# Patient Record
Sex: MALE | Race: WHITE
[De-identification: names, ages, dates, MRNs, and addresses within clinical notes are randomized per-mention and may not be internally consistent; named-entity substitution may affect disease eponyms.]

---

## 2017-03-07 ENCOUNTER — HOSPITAL ENCOUNTER (OUTPATIENT)
Dept: HOSPITAL 39 - GMAM | Age: 77
Discharge: HOME | End: 2017-03-07
Attending: FAMILY MEDICINE
Payer: MEDICARE

## 2017-03-07 DIAGNOSIS — E87.6: Primary | ICD-10-CM

## 2017-07-14 ENCOUNTER — HOSPITAL ENCOUNTER (OUTPATIENT)
Dept: HOSPITAL 39 - GMAM | Age: 77
End: 2017-07-14
Attending: FAMILY MEDICINE
Payer: MEDICARE

## 2017-07-14 DIAGNOSIS — Z12.5: Primary | ICD-10-CM

## 2018-01-03 ENCOUNTER — HOSPITAL ENCOUNTER (OUTPATIENT)
Dept: HOSPITAL 39 - GMAM | Age: 78
Discharge: HOME | End: 2018-01-03
Attending: FAMILY MEDICINE
Payer: MEDICARE

## 2018-01-03 DIAGNOSIS — I10: ICD-10-CM

## 2018-01-03 DIAGNOSIS — E11.65: Primary | ICD-10-CM

## 2018-01-03 DIAGNOSIS — R97.20: ICD-10-CM

## 2018-01-12 ENCOUNTER — HOSPITAL ENCOUNTER (OUTPATIENT)
Dept: HOSPITAL 39 - RAD | Age: 78
Discharge: HOME | End: 2018-01-12
Attending: PHYSICIAN ASSISTANT
Payer: MEDICARE

## 2018-01-12 DIAGNOSIS — R31.0: Primary | ICD-10-CM

## 2018-01-12 NOTE — CT
EXAM DESCRIPTION: 

Abdomen/Pelvis w/o Contrast: Computed tomography



CLINICAL HISTORY: 

GROSS HEMATURIA



COMPARISON: 

None.



TECHNIQUE: 

Spiral-axial scans at 5.0 mm intervals through the abdomen and

pelvis. Coronal and sagittal 2.0mm reconstructions. No IV or oral

contrast.  Total Exam DLP: 664.66 mGy-cm.  This exam was

performed according to our departmental CT dose-optimization

program which includes automated exposure control, adjustment of

the mA and/or kV according to patient size and/or use of

iterative reconstruction technique; to reduce radiation dose to

as low as reasonably achievable (ALARA). 



FINDINGS:

Kidneys and Ureters: 2 mm radiodense stone in the distal right

ureter which is dilated with minimal periureteral edema. Normal

caliber of the mid and proximal ureter no radiodense stones in

the right kidney or hydronephrosis/perinephric stranding. Also

mass effect on the distal right ureter from prostate gland.

Minimal mass effect on the left UVJ from the prostate gland.

Normal caliber of the left ureter with no radiodense stones. No

hydronephrosis/perinephric stranding on the left. Probable

vascular calcification in the upper anterior left renal pelvis.

Pelvic Organs: Marked enlargement of the prostate gland with

calcifications in the central zone and peripheral solid and

lobulation in heterogeneous density in the upper gland impressing

on the bladder. 6.0 cm craniocaudal and 6.2 x 5.3 cm transverse.

Mass effect also on the seminal vesicles. No free fluid in the

anterior peritoneal reflection.

Lung and pleura bases: 4 mm soft tissue nodule in the right lower

lobe subpleural region (series 2, image 13). Coronary artery

stents and calcifications.

Liver, spleen, stomach, and adrenal glands: Vascular

calcifications around these organs which are not enlarged with

normal density. No ascites.

Pancreas, Gallbladder, Ducts: Multiple layering stones in the

gallbladder no definite wall thickening no fatty stranding.

Normal caliber of the common bile duct. Calcification in the mid

pancreas.

Aorta: Marked atherosclerotic wall calcification predominately

mid and distally. Calcification also the ostia of the celiac

axis, SMA bilateral renal arteries and DEONNA.

Small Bowel: Negative.

Terminal Ileum/Cecum: Normal caliber of both. Appendix stump with

minimal thickening no surrounding stranding or fascial thickening

or fluid. 

Colon: Distention by fecal material entire length with extensive

redundancy of the sigmoid colon to the upper anterior mid

abdomen. No definite diverticula.

Mesentery: No stranding or fascial thickening. No free

intraperitoneal air.

Spine and Bony Pelvis: Lumbar levoscoliosis. Diffuse spondylosis

thoracic spine and mid to lower lumbar spine. Significant

bilateral foraminal narrowing L3-4 and L4-5 also bilateral

T10-11. Bilateral hypertrophic changes in the acetabula with

subchondral cysts and radiolucencies superior joint space

narrowing. Marginal spurs on the humeral femoral heads. Bilateral

bone islands in the iliac crests question of bone mineral density

loss or sclerotic lesions in the bilateral iliac bones and

ischial and bilateral femoral necks and intertrochanteric

regions.

Abdominal Wall/Back Soft Tissues: Bilateral fatty inguinal

hernias left greater than right not containing bowel. Abdominal

wall diastases at the umbilicus not containing bowel.



IMPRESSION:

1. No renal hydronephrosis or perinephric fluid bilaterally.

Dilation of the distal right ureter containing a 2 mm radiodense

stone with mass effect on the right UVJ from enlarged prostate

gland. Also exhibiting mass effect on the left UVJ and the base

of the urinary bladder. No radiodense stones, hydronephrosis or

perinephric fluid of the left kidney or distention of the left

ureter.

2. No radiodense stones in the bladder. Lobulated thickening of

the base of the bladder with mass effect from the prostate gland.

Cannot exclude mass at the base of the bladder; urologic consult

recommended. Enlarged prostate gland- Also consider serum PSA

evaluation.

3. Question of small lytic lesions versus loss of bone

mineralization pelvis and upper femurs. Diffuse spondylosis mid

and lower lumbar spine and thoracic spine.

4. Marked atherosclerotic changes in the abdominal aorta mid and

lower more significant. Calcification of the ostia of the major

vessels originating from the aorta. Consider CTA abdomen and

lower extremities if patient exhibiting signs of poor vascular

supply in the abdomen and PVD.

5. Bilateral fatty inguinal hernias not containing bowel.

Umbilical diastases not containing bowel. Abdominal organs not

enlarged. 4 mm soft tissue nodule right lung base is subclinical

at this time.



Electronically signed by:  Keith Mckeon MD  1/12/2018 11:41 AM

[facility] Workstation: [id number]

## 2018-01-17 ENCOUNTER — HOSPITAL ENCOUNTER (OUTPATIENT)
Dept: HOSPITAL 39 - LAB.O | Age: 78
Discharge: HOME | End: 2018-01-17
Attending: UROLOGY
Payer: MEDICARE

## 2018-01-17 DIAGNOSIS — R31.0: ICD-10-CM

## 2018-01-17 DIAGNOSIS — R97.20: Primary | ICD-10-CM

## 2018-01-18 ENCOUNTER — HOSPITAL ENCOUNTER (OUTPATIENT)
Dept: HOSPITAL 39 - CT | Age: 78
Discharge: HOME | End: 2018-01-18
Attending: UROLOGY
Payer: MEDICARE

## 2018-01-18 DIAGNOSIS — R31.0: Primary | ICD-10-CM

## 2018-01-18 NOTE — CT
EXAM DESCRIPTION: 

Abdomen/Pelvis w/Contrast: Computed Tomography.



CLINICAL HISTORY: 

GROSS HEMATURIA. Bladder mass.



COMPARISON: CT scan of abdomen and pelvis without IV or oral

contrast 1/12/2018.



TECHNIQUE: 

Spiral-axial scans at 5.0 mm intervals through the abdomen and

pelvis, after nonionic IV contrast. No oral contrast. Coronal and

sagittal 2.0 mm reconstructions. Delayed scans, liver through the

pelvis.   Axial-spiral 5mm. No adverse reactions.  Total Exam

DLP: 1627.9 mGy-cm.  This exam was performed according to our

departmental dose-optimization program which includes automated

exposure control, adjustment of the mA and/or kV according to

patient size and/or use of iterative reconstruction technique; to

reduce radiation dose to as low as reasonably achievable (ALARA).



FINDINGS: 

Kidneys and Ureters: Normal enhancement with no hydronephrosis or

perinephric fluid. No radiodense stones. 8 millimeter round

low-density object in the inferior right kidney. Vascular

calcification left kidney abutting the lateral left renal artery.

No radiodense stones in the ureters bilaterally normal caliber

and no periureteral edema.

Pelvic Organs: Enlarged prostate gland measuring 6.4 x 5.4 cm in

the transverse plane and 6.4 cm craniocaudally. TURP defect is

seen in the superior anterior prostate gland extending to the

left of midline. Calcification posteriorly and inferiorly in the

gland and abutting the TURP defect. Smooth margins of the

inferior gland but lobulated superior margin with mass effect on

the urinary bladder. Urinary bladder wall thickening. No fluid in

the anterior peritoneal reflection and no pelvic masses.

Lung bases and pleura: Stable nodule right lower lobe and

coronary artery stents/calcifications.

Liver, Stomach, Spleen, Adrenal Glands: Stable since the prior

study.

Pancreas, Gallbladder, Ducts: Cholelithiasis of the gallbladder

is stable. Common duct and pancreas unchanged.

Mesentery: Negative.

Aorta: Diffuse atherosclerotic calcification with distal

narrowing and extraluminal calcification and intimal wall

thickening unchanged.

Small Bowel: Unremarkable.

Terminal Ileum/Cecum: Negative.

Colon: Stable.

Spine and Bony Pelvis: Again noted are bilateral punctate lytic

lesions and areas of bone density loss as well as spondylosis in

the thoracic and lumbar spine and arthrosis in the bilateral

acetabula. Also focal sclerotic lesions in the bilateral iliac

wings.

Abdominal Wall/Back Soft Tissues: Stable left inguinal hernia not

containing bowel.



IMPRESSION: 

1. Enlarged prostate gland again noted with lobulated mass effect

on the urinary bladder base which contrasts with the IV contrast

in the urinary bladder. Urinary bladder wall thickened. TURP

defect in the gland is displaced to the left of midline. This

could indicate an inflammatory or neoplastic process in the right

side of the gland. No pelvic masses or fluid.

2. Normal caliber of the distal right ureter not containing a

radiodense stone.

3. 8-mm region of low-density in the anterior inferior left

kidney, too small to resolve as a cyst. Consider evaluation with

renal ultrasound.

4. Remainder of the findings in the abdomen and pelvis are stable

since the prior study.



Electronically signed by:  Keith Mckeon MD  1/18/2018 5:01 PM [facility]

Workstation: [id number]

## 2018-02-15 ENCOUNTER — HOSPITAL ENCOUNTER (EMERGENCY)
Dept: HOSPITAL 39 - ER | Age: 78
Discharge: HOME | End: 2018-02-15
Payer: MEDICARE

## 2018-02-15 VITALS — TEMPERATURE: 97 F | OXYGEN SATURATION: 96 % | SYSTOLIC BLOOD PRESSURE: 201 MMHG | DIASTOLIC BLOOD PRESSURE: 101 MMHG

## 2018-02-15 DIAGNOSIS — Z79.01: ICD-10-CM

## 2018-02-15 DIAGNOSIS — I25.2: ICD-10-CM

## 2018-02-15 DIAGNOSIS — Z87.891: ICD-10-CM
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
## 2018-02-15 DIAGNOSIS — I10: ICD-10-CM

## 2018-02-15 DIAGNOSIS — S20.212A: Primary | ICD-10-CM

## 2018-02-15 DIAGNOSIS — E11.9: ICD-10-CM

## 2018-02-15 DIAGNOSIS — Z95.1: ICD-10-CM

## 2018-02-15 DIAGNOSIS — Z98.61: ICD-10-CM

## 2018-02-15 NOTE — RAD
EXAM DESCRIPTION: 



Ribs,Left 2 Views



CLINICAL HISTORY: 



fall with ant left lower rib pain



COMPARISON: 



15 February 2018 chest x-ray



TECHNIQUE: 



3 views



FINDINGS: 



No pneumothorax is seen. The patient is poststernotomy. A small

bone island is observed in the anterior aspect of the left sixth

anterior. No rib fracturing is detected.



IMPRESSION: 



No rib fracturing is detected.



Electronically signed by:  Kermit Finley MD  2/15/2018 2:29 PM

[facility] Workstation: [id number]

## 2018-02-15 NOTE — RAD
EXAM DESCRIPTION: 



Chest,1 View



CLINICAL HISTORY: 



fall with ant left lower rib pain 



COMPARISON: 



None available 



FINDINGS: 



Postoperative changes are noted in the mediastinum. The

cardiomedial mediastinal silhouette is otherwise unremarkable.

There is no airspace consolidation or pleural effusion. Mural

symmetric nodules projecting of the lung bases likely represent

nipple shadows. There is no pneumothorax or acute fracture.



IMPRESSION: 



Postoperative changes in the mediastinum, but no acute

intrathoracic abnormality to explain patient symptoms.



Probable nipple shadows projecting over the lung bases. Follow-up

chest radiograph with nipple markers or short-term follow-up

chest radiograph in 2-3 months is recommended.



Electronically signed by:  Meliton Rios MD  2/15/2018 2:24 PM [facility]

Workstation: [id number]

## 2018-02-15 NOTE — ED.PDOC
History of Present Illness





- General


Chief Complaint: General


Stated Complaint: Fell at restaurant, hit table with chest


Time Seen by Provider: 02/15/18 13:38


Source: patient


Exam Limitations: no limitations





- History of Present Illness


Initial Comments: 





The patient is a 77-year-old  male presenting to the emergency room 

secondary to having tripped and fallen and hit his left lower rib cage on the 

edge of a table approximately 1 hour prior to arrival.  He does have some pain 

over the area.  There is no crepitus or crepitus.  No deformity.  There is mild 

erythema.  There is mild tenderness to palpation.  It does cause him some 

discomfort when he takes a deep breath.  He is not short of breath.  He does 

have some elevated blood pressure which is consistent with pain.  No chest pain 

otherwise.  He does take a blood thinner.  No other injury from the fall.


Timing/Duration: 1 hour


Severity: moderate


Improving Factors: immobilization


Worsening Factors: movement


Associated Symptoms: denies symptoms


Allergies/Adverse Reactions: 


Allergies





NO KNOWN ALLERGY Allergy (Verified 02/15/18 13:34)


 








Home Medications: 


Ambulatory Orders





Acetaminophen-Caff-Butalbital [Fioricet] 1 ea PO Q8H PRN #21 tab 02/15/18 


Apixaban [Eliquis] 5 mg PO BID 02/15/18 


Atorvastatin Calcium [Lipitor] 40 mg PO BEDTIME 02/15/18 


Clonidine Patch 0.3 0.3 mg TD WKLY 02/15/18 


Isosorbide Mononitrate [Isosorbide Mononitrate ER] 30 mg PO DAILY 02/15/18 


Lisinopril 40 mg PO DAILY 02/15/18 


Metoprolol Tartrate 75 mg PO DAILY 02/15/18 


Multiple Vitamins W/ Minerals [Multivitamin Adults] 1 tab PO DAILY 02/15/18 


Nifedipine [Nifedipine ER] 60 mg PO BID 02/15/18 


Potassium Chloride [Micro-K] 8 meq PO DAILY 02/15/18 


hydrALAZINE HCl [(None)] 25 mg PO TID 02/15/18 


metFORMIN HCL [Glucophage] 500 mg PO BID 02/15/18 











Review of Systems





- Review of Systems


Constitutional: States: no symptoms reported


EENTM: States: no symptoms reported


Respiratory: States: no symptoms reported


Cardiology: States: chest pain


Gastrointestinal/Abdominal: States: no symptoms reported


Genitourinary: States: no symptoms reported


Musculoskeletal: States: see HPI


Skin: States: no symptoms reported


Neurological: States: no symptoms reported


Endocrine: States: no symptoms reported


All other Systems: No Change from Baseline





Past Medical History (General)





- Patient Medical History


Hx Stroke: No


Hx Cardiac Disorders: Yes - cardiac stents - Hx MI 


Hx Congestive Heart Failure: No


Hx Hypertension: Yes


Hx Diabetes: Yes


Hx MRSA: No


Surgical History: coronary bypass surgery, other





- Vaccination History


Hx Influenza Vaccination: Yes - 


Hx Pneumococcal Vaccination: Yes





- Social History


Hx Tobacco Use: Yes - Quit around 





Family Medical History





- Family History


  ** Father


Family History: No Known


Living Status: 





Physical Exam





- Physical Exam


General Appearance: Alert, No apparent distress


Eye Exam: bilateral normal


Ears, Nose, Throat: hearing grossly normal, normal ENT inspection, normal 

pharynx


Neck: full range of motion, supple


Respiratory: lungs clear, normal breath sounds, no respiratory distress, no 

accessory muscle use, other - left anterior lower chest wall uncomfortable to 

palpation.  Mild erythema.


Cardiovascular/Chest: normal peripheral pulses, regular rate, rhythm, no edema, 

other - he patient has become more tachycardic when he is hurting more.


Peripheral Pulses: radial,right: 2+, radial,left: 2+, dorsalis pedis,right: 2+, 

dorsalis pedis,left: 2+


Gastrointestinal/Abdominal: non tender, soft


Rectal Exam: deferred


Back Exam: normal inspection, no CVA tenderness, no vertebral tenderness


Extremity: normal range of motion, non-tender, normal inspection, no pedal edema

, normal capillary refill


Neurologic: CNs II-XII nml as tested, alert, normal mood/affect, oriented x 3


Skin Exam: normal color - except as above


Comments: 





 Vital Signs - 24 hr











  02/15/18 02/15/18





  13:31 14:24


 


Temperature 97.8 F 


 


Pulse Rate [ 118 H 103 H





Left  





Superficial  





Temporal]  


 


Respiratory 18 18





Rate  


 


Blood Pressure 213/111 194/81





[Left Arm]  


 


O2 Sat by Pulse 94 L 95





Oximetry  














Progress





- Progress


Progress: 





02/15/18 14:43


the patient is a 77-year-old  male presenting with blunt trauma to his 

left anterior chest wall that appears to only be bruising at this point.  X-ray 

shows no evidence of any fracture or pneumothorax or hemothorax.  He needs to 

take big deep breaths to prevent pneumonia formation in the near future.  He 

will be written for Fioricet for pain control as needed.  He should follow-up 

with his primary care doctor early next week for reevaluation.  ER warnings 

were given for any significant worsening.





Departure





- Departure


Clinical Impression: 


Contusion of rib


Qualifiers:


 Encounter type: initial encounter Laterality: left Qualified Code(s): S20.212A 

- Contusion of left front wall of thorax, initial encounter





Disposition: Discharge to Home or Self Care


Condition: Fair


Departure Forms:  ED Discharge - Pt. Copy, Patient Portal Self Enrollment


Instructions:  DI for Rib Contusion


Diet: regular diet


Activity: increase activity as tolerated


Referrals: 


Darin Hanks MD [Primary Care Provider] - 1-5 Days


Prescriptions: 


Acetaminophen-Caff-Butalbital [Fioricet] 1 ea PO Q8H PRN #21 tab


 PRN Reason: Pain


Home Medications: 


Ambulatory Orders





Acetaminophen-Caff-Butalbital [Fioricet] 1 ea PO Q8H PRN #21 tab 02/15/18 


Apixaban [Eliquis] 5 mg PO BID 02/15/18 


Atorvastatin Calcium [Lipitor] 40 mg PO BEDTIME 02/15/18 


Clonidine Patch 0.3 0.3 mg TD WKLY 02/15/18 


Isosorbide Mononitrate [Isosorbide Mononitrate ER] 30 mg PO DAILY 02/15/18 


Lisinopril 40 mg PO DAILY 02/15/18 


Metoprolol Tartrate 75 mg PO DAILY 02/15/18 


Multiple Vitamins W/ Minerals [Multivitamin Adults] 1 tab PO DAILY 02/15/18 


Nifedipine [Nifedipine ER] 60 mg PO BID 02/15/18 


Potassium Chloride [Micro-K] 8 meq PO DAILY 02/15/18 


hydrALAZINE HCl [(None)] 25 mg PO TID 02/15/18 


metFORMIN HCL [Glucophage] 500 mg PO BID 02/15/18 








Additional Instructions: 


the patient is a 77-year-old  male presenting with blunt trauma to his 

left anterior chest wall that appears to only be bruising at this point.  X-ray 

shows no evidence of any fracture or pneumothorax or hemothorax.  He needs to 

take big deep breaths to prevent pneumonia formation in the near future.  He 

will be written for Fioricet for pain control as needed.  He should follow-up 

with his primary care doctor early next week for reevaluation.  ER warnings 

were given for any significant worsening.

## 2018-08-03 ENCOUNTER — HOSPITAL ENCOUNTER (OUTPATIENT)
Dept: HOSPITAL 39 - GMAM | Age: 78
End: 2018-08-03
Attending: FAMILY MEDICINE
Payer: MEDICARE

## 2018-08-03 DIAGNOSIS — E29.1: ICD-10-CM

## 2018-08-03 DIAGNOSIS — E83.52: Primary | ICD-10-CM

## 2018-08-08 ENCOUNTER — HOSPITAL ENCOUNTER (OUTPATIENT)
Dept: HOSPITAL 39 - CT | Age: 78
End: 2018-08-08
Attending: FAMILY MEDICINE
Payer: MEDICARE

## 2018-08-08 DIAGNOSIS — R91.1: Primary | ICD-10-CM

## 2018-08-09 NOTE — CT
EXAM DESCRIPTION: Chest w/Contrast



CLINICAL HISTORY: 78 years, Male, PULMONARY NODULE



COMPARISON: CT abdomen and pelvis January 12, 2018, chest x-ray

February 15, 2018



TECHNIQUE: Thin-section noncontrast axial CT images are obtained

according to our protocol. Reconstructed MPR images are created

and reviewed as well. Routine adult dose of nonionic iodinated

contrast was administered intravenously.



FINDINGS:



Lungs: No consolidating pulmonary infiltrate or groundglass

infiltrate.  No worrisome pulmonary mass or nodule. On the

previous study January 18, 2018, tiny nodule is seen in the right

lower lobe reported as 4 mm in diameter. This now measures 4 mm

unchanged. Another tiny nodule in the superior segment right

lower lobe measures 4 mm. This was not included on the previous

CT abdomen and pelvis and may be another small granuloma.

Continued follow-up is recommended since this is a new discovery.



Mediastinum: Lymph nodes are normal in size.

 Normal vascular contours.  Heart size is normal with no

pericardial effusion. There is extensive coronary arterial

calcification. Coronary artery bypass graft is seen. Grafts are

seen.



Chest wall/axilla: No mass or adenopathy.



Lower neck/supraclavicular: No mass or adenopathy.



Upper abdomen: Multiple calcified stones in the gallbladder.

Adrenal glands are somewhat prominent in thickness suggesting

mild hyperplasia. Otherwise unremarkable upper abdominal viscera.



Coronal and sagittal reformatted images confirm the findings.



Previous chest x-ray showed nodular densities over the lung bases

which were apparently nipple shadows. Neither of the nodules in

the right lower lobe seen on the chest CT are identifiable on the

chest x-ray.



IMPRESSION: 



Two 4 mm nodules in the right lower lobe. One of these is

unchanged since previous CT abdomen January 18, 2018.



Follow-up as per recommendations below.



2017 Fleischner Society Recommendations for Multiple Solid Lung

Nodules Follow-Up base on size (average of long- and short-axis

diameters). Use most suspicious nodule for followup.



Nodule Size <6 mm Low-Risk Patient: No routine follow-up 

Nodule Size <6 mm High-Risk Patient: Optional CT at 12 months







This exam was performed according to our departmental

dose-optimization program, which includes automated exposure

control, adjustment of the mA and/or kV according to patient size

and/or use of iterative reconstruction technique. Total DLP

equals 493.21 mGycm.



Electronically signed by:  Toño Moreno MD  8/9/2018 12:40

PM [facility] Workstation: [id number]

## 2019-02-18 ENCOUNTER — HOSPITAL ENCOUNTER (OUTPATIENT)
Dept: HOSPITAL 39 - GMAM | Age: 79
End: 2019-02-18
Attending: FAMILY MEDICINE
Payer: MEDICARE

## 2019-02-18 DIAGNOSIS — Z12.5: Primary | ICD-10-CM

## 2019-06-21 ENCOUNTER — HOSPITAL ENCOUNTER (OUTPATIENT)
Age: 79
End: 2019-06-21
Payer: MEDICARE

## 2019-09-19 ENCOUNTER — HOSPITAL ENCOUNTER (OUTPATIENT)
Dept: HOSPITAL 39 - GMAM | Age: 79
End: 2019-09-19
Attending: FAMILY MEDICINE
Payer: MEDICARE

## 2019-09-19 DIAGNOSIS — E11.69: ICD-10-CM

## 2019-09-19 DIAGNOSIS — E78.2: ICD-10-CM

## 2019-09-19 DIAGNOSIS — E29.1: ICD-10-CM

## 2019-09-19 DIAGNOSIS — Z12.5: Primary | ICD-10-CM

## 2019-09-19 DIAGNOSIS — I10: ICD-10-CM

## 2019-09-19 PROCEDURE — 84403 ASSAY OF TOTAL TESTOSTERONE: CPT

## 2019-09-27 ENCOUNTER — HOSPITAL ENCOUNTER (EMERGENCY)
Dept: HOSPITAL 39 - ER | Age: 79
Discharge: ANOTHER HOSPITAL | End: 2019-09-27
Payer: MEDICARE

## 2019-09-27 VITALS — DIASTOLIC BLOOD PRESSURE: 124 MMHG | OXYGEN SATURATION: 99 % | SYSTOLIC BLOOD PRESSURE: 171 MMHG

## 2019-09-27 VITALS — TEMPERATURE: 97.8 F

## 2019-09-27 DIAGNOSIS — I10: ICD-10-CM
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
## 2019-09-27 DIAGNOSIS — M25.531: ICD-10-CM

## 2019-09-27 DIAGNOSIS — Z79.01: ICD-10-CM

## 2019-09-27 DIAGNOSIS — Z95.1: ICD-10-CM

## 2019-09-27 DIAGNOSIS — Z79.84: ICD-10-CM
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
## 2019-09-27 DIAGNOSIS — E11.9: ICD-10-CM

## 2019-09-27 DIAGNOSIS — M47.812: ICD-10-CM

## 2019-09-27 DIAGNOSIS — I48.91: ICD-10-CM

## 2019-09-27 DIAGNOSIS — Z87.891: ICD-10-CM

## 2019-09-27 DIAGNOSIS — S06.6X9A: Primary | ICD-10-CM

## 2019-09-27 NOTE — CT
EXAM DESCRIPTION: Head



CLINICAL HISTORY: CHI



COMPARISON: Head injury



TECHNIQUE: Noncontrast head CT was performed with routine

protocol.



FINDINGS:



High density in the right sylvian fissure is consistent with

subarachnoid blood of small amount. Similar changes are seen in

other sulci in the bilateral frontal regions. Subtle hypodensity

at the right frontal gray-white matter junction may be a small

shear injury. Subarachnoid blood on the left in the posterior

frontal sulcus. No midline shift. No mass effect. Ventricles and

sulci are prominent consistent with age-related cerebral volume

loss. Normal orbital contents. Basilar cisterns appear clear.



Intact calvarium with no fracture or lytic lesion. Normal

aeration of tympanic cavities and mastoid air cells. No fluid

levels in the paranasal sinuses. Skull base appears intact.

Symmetrical internal auditory canals. Coronal and sagittal

reformatted images confirm the findings.



I discussed the results over the phone with Dr. Dumont at the

[facility] at 2:33 PM on

9/27/2019.



IMPRESSION:



Small volume subarachnoid hemorrhage.



Small right frontal gray-white matter shear injury.









This exam was performed according to our departmental

dose-optimization program, which includes automated exposure

control, adjustment of the mA and/or kV according to patient size

and/or use of iterative reconstruction technique. Total DLP

equals 859.97 mGycm.



Electronically signed by:  Toño Moreno MD  9/27/2019 2:35

PM CDT Workstation: [id number]

## 2019-09-27 NOTE — RAD
EXAM DESCRIPTION: Wrist,Right 3 Views



CLINICAL HISTORY: 79 years, Male, fall, pain



COMPARISON: None



FINDINGS: 

Right wrist 3 x-ray views is negative for fracture or

dislocation.

Carpal relationships are well-maintained. Distal radius and ulna

appear intact. Normal metacarpals.

Degenerative narrowing of the radiocarpal joint with degenerative

changes at the first carpometacarpal joint. Vascular

calcification is present with soft tissue swelling of the ventral

wrist and distal forearm.



IMPRESSION:



Negative for fracture or dislocation.



Electronically signed by:  Toño Moreno MD  9/27/2019 2:40

PM CDT Workstation: [id number]

## 2019-09-27 NOTE — RAD
EXAM DESCRIPTION: Forearm,Right



CLINICAL HISTORY: 79 years Male, fall, pain



COMPARISON: None.



FINDINGS: Frontal and lateral x-ray views of the right forearm

show intact radius and ulna. No fracture or dislocation. Small

osteophytes of the proximal radius and ulna at the elbow joint.

Vascular calcification is present. No dislocation. The distal

humeral fat pads are normally positioned. Prominent spurring of

the coronoid and olecranon processes of the proximal ulna. Radial

head and capitellum are normally aligned on both views.



IMPRESSION:



Negative for fracture or dislocation.



Electronically signed by:  Toño Moreno MD  9/27/2019 2:39

PM CDT Workstation: [id number]

## 2019-09-27 NOTE — CT
EXAM DESCRIPTION: Cervical Spine



CLINICAL HISTORY: fall, head injury



COMPARISON: None Available.



TECHNIQUE: Cervical CT is performed with thin-section axial

imaging. MPRs are created and reviewed as well.



FINDINGS:



Axial bone window images reveal intact ring of C1. No abnormal

widening of the atlantodens interval. No fracture of the

vertebral bodies or transverse processes or posterior elements.

Lung apices appear clear. No cervical mass or adenopathy.



Sagittal reformatted images show normal alignment of vertebral

bodies and facets. No jumped facet or facet fracture. Normal

craniocervical alignment. No prevertebral soft tissue swelling.

No avulsion of the spinous processes.



Spondylosis: Advanced degenerative disc disease at C4-5 and C5-6

with anterior and posterior osteophyte formation. Multilevel

facet degenerative spurring. Marked degenerative changes around

the dens.



Coronal reformatted images show normal atlantooccipital and

atlantoaxial alignment. The base of the dens is intact as is the

body of C2. Intact lateral masses.



IMPRESSION:



Negative for fracture or posttraumatic subluxation.



Degenerative changes as described.







This exam was performed according to our departmental

dose-optimization program, which includes automated exposure

control, adjustment of the mA and/or kV according to patient size

and/or use of iterative reconstruction technique. Total DLP

equals 406.41 mGycm.



Electronically signed by:  Toño Moreno MD  9/27/2019 2:37

PM CDT Workstation: [id number]

## 2019-09-27 NOTE — ED.PDOC
History of Present Illness





- General


Chief Complaint: Syncope/Near Syncope


Stated Complaint: Pt had a fall and possible near syncopal event


Time Seen by Provider: 09/27/19 13:52


Source: patient, family


Additional Information: 





Mister Buchanan is an 79-year-old male who presents to the ED with chief complaint 

of syncope.  Prior to arrival patient was sitting in his chair and then the next

thing he remembers is he is on the floor.  He has no idea how he got there.  His

wife came into the room and found him and noted that he was not unconscious and 

was fully lucid upon arrival.  Patient denies shortness of breath dizziness 

nausea vomiting abdominal pain.  Patient indicates he does not believe that he 

hit his head, he has no headache or neck pain.  Patient does complain of right 

wrist pain.  Patient has no history of previous similar episodes.  Patient 

indicates that he has a history of atrial fibrillation and is on Eliquis





- History of Present Illness


Allergies/Adverse Reactions: 


Allergies





NO KNOWN ALLERGY Allergy (Verified 02/15/18 13:34)


   





Home Medications: 


Ambulatory Orders





Acetaminophen-Caff-Butalbital [Fioricet] 1 ea PO Q8H PRN #21 tab 02/15/18 


Apixaban [Eliquis] 5 mg PO BID 02/15/18 


Atorvastatin Calcium [Lipitor] 40 mg PO BEDTIME 02/15/18 


Clonidine Patch 0.3 0.3 mg TD WKLY 02/15/18 


Isosorbide Mononitrate [Isosorbide Mononitrate ER] 30 mg PO DAILY 02/15/18 


Lisinopril 40 mg PO DAILY 02/15/18 


Metoprolol Tartrate 75 mg PO DAILY 02/15/18 


Multiple Vitamins W/ Minerals [Multivitamin Adults] 1 tab PO DAILY 02/15/18 


Nifedipine [Nifedipine ER] 60 mg PO BID 02/15/18 


Potassium Chloride [Micro-K] 8 meq PO DAILY 02/15/18 


hydrALAZINE HCl [(None)] 25 mg PO TID 02/15/18 


metFORMIN HCL [Glucophage] 500 mg PO BID 02/15/18 











Review of Systems





- Review of Systems


Constitutional: States: no symptoms reported.  Denies: chills, fever


EENTM: States: no symptoms reported.  Denies: blurred vision, double vision


Respiratory: States: no symptoms reported.  Denies: cough, short of breath


Cardiology: States: palpitations.  Denies: chest pain


Gastrointestinal/Abdominal: States: no symptoms reported.  Denies: diarrhea, 

nausea, vomiting


Genitourinary: States: no symptoms reported


Musculoskeletal: States: see HPI


Skin: States: no symptoms reported


Neurological: States: no symptoms reported.  Denies: headache, numbness, 

paresthesia


Hematologic/Lymphatic: States: no symptoms reported





Past Medical History (General)





- Patient Medical History


Hx Stroke: No


Hx of COPD: No


Hx Cardiac Disorders: Yes - Afib


Hx Congestive Heart Failure: No


Hx Hypertension: Yes


Hx Diabetes: Yes


Hx Cancer: No


Hx MRSA: No


Surgical History: coronary bypass surgery, other





- Vaccination History


Hx Tetanus, Diphtheria Vaccination: Yes


Hx Influenza Vaccination: Yes


Hx Pneumococcal Vaccination: Yes





- Social History


Hx Tobacco Use: Yes


Hx Alcohol Use: No


Hx Substance Use: No


Hx Substance Use Treatment: No


Hx Depression: No





- Female History


Patient is a Female of Child Bearing Age (10 -59 yrs old): No


Patient Pregnant: No





Physical Exam





- Physical Exam


General Appearance: Alert, Comfortable, No apparent distress


Eyes, Ears, Nose, Throat Exam: PERRL/EOMI, normal ENT inspection, pharynx 

normal, other - negative face or scalp hematoma or abrasion.


Neck: other - trachea is midline.  Negative cervical vertebral tenderness to 

palpation.


Cardiovascular/Respiratory: no M/R/G, irregularly irregular


Gastrointestinal/Abdominal: normal bowel sounds, non tender, soft, no 

organomegaly


Back Exam: normal inspection, no CVA tenderness, no vertebral tenderness


Extremity: normal range of motion, other - mild edema and tenderness to 

palpation to right wrist.  Full range of motion.


Mental Status: alert, oriented x 3


CNs Exam: other - Negative pronator drift.  Normal finger to nose bilaterally.  

5/5 strength in biceps, triceps, .  Normal sensation to light touch 

bilaterally.


Skin Exam: normal color, warm/dry





Progress





- Progress


Progress: 





09/27/19 1355


differential diagnosis includes but is not limited to syncope, cardiac dys

rhythmia, ICH, electrolyte disorder





09/27/19 1400


EKG: Atrial fibrillation, rate 109, normal QRS, T wave inversions inferiorly, 

normal ST segment.  Negative STEMI.





09/27/19 1530


Patient's CT brain shows evidence of small subarachnoid hemorrhage, traumatic.  

I have discussed with Dr. Hamilton at [facility] who 

accepts patient  ED to ED transfer.  I am discussing with the pharmacist whether

 we have a specific reversal agent for Eliquis at [facility].  If not will begin infusion of FFP prior to transfer.  Patient's x-ray 

of his wrist is nonacute and shows no fracture.  At this time patient is 

comfortable and stable and is fully mentating with no neurologic deficits.  He 

is medically clear and stable for transfer.





09/27/19 16:16


Have discussed with the pharmacist and blood bank and there is no specific 

antidote at [facility] for Jason  The only option is FFP.  I have 

discussed this with the receiving physician Dr. Hamilton at [facility] and he

 indicates that they have specific reversal agents in their formulary and to 

please transfer patient without FFP and they will address the anticoagulation.

















- EKG/XRAY/CT


CT Ordered: Yes





Departure





- Departure


Clinical Impression: 


 Subarachnoid hemorrhage after traumatic injury without open intracranial wound,

 with prolonged loss of consciousness and return to pre-existing level of 

consciousness, Atrial fibrillation





Time of Disposition: 15:30


Disposition: Transfer to Hospital


Condition: Fair


Departure Forms:  ED Discharge - Pt. Copy, Patient Portal Self Enrollment


Referrals: 


Darin Hanks MD [Primary Care Provider] - 1-2 Weeks


Home Medications: 


Ambulatory Orders





Acetaminophen-Caff-Butalbital [Fioricet] 1 ea PO Q8H PRN #21 tab 02/15/18 


Apixaban [Eliquis] 5 mg PO BID 02/15/18 


Atorvastatin Calcium [Lipitor] 40 mg PO BEDTIME 02/15/18 


Clonidine Patch 0.3 0.3 mg TD WKLY 02/15/18 


Isosorbide Mononitrate [Isosorbide Mononitrate ER] 30 mg PO DAILY 02/15/18 


Lisinopril 40 mg PO DAILY 02/15/18 


Metoprolol Tartrate 75 mg PO DAILY 02/15/18 


Multiple Vitamins W/ Minerals [Multivitamin Adults] 1 tab PO DAILY 02/15/18 


Nifedipine [Nifedipine ER] 60 mg PO BID 02/15/18 


Potassium Chloride [Micro-K] 8 meq PO DAILY 02/15/18 


hydrALAZINE HCl [(None)] 25 mg PO TID 02/15/18 


metFORMIN HCL [Glucophage] 500 mg PO BID 02/15/18 











Critical Care Note





- Critical Care Note


Total Time (mins): 35





Transfer to Outside Facility





- Transfer Information


Accepting Provider:: Dr. Hamilton


Accepting Facility: [facility]

## 2019-10-01 ENCOUNTER — HOSPITAL ENCOUNTER (OUTPATIENT)
Dept: HOSPITAL 39 - CT | Age: 79
End: 2019-10-01
Attending: FAMILY MEDICINE
Payer: MEDICARE

## 2019-10-01 DIAGNOSIS — R91.1: Primary | ICD-10-CM

## 2019-10-01 NOTE — CT
EXAM DESCRIPTION: Chest w/Contrast



CLINICAL HISTORY: 79 years Male, PULMONARY NODULE



COMPARISON: CT chest 8/8/2018, CT abdomen and pelvis 1/18/2018



TECHNIQUE: CT images through the chest with IV contrast.

Multiplanar reformations provided.  This exam was performed

according to our departmental dose-optimization program, which

includes automated exposure control, adjustment of the mA and/or

kV according to patient size and/or use of iterative

reconstruction technique.



CT CHEST FINDINGS:

Heart and mediastinum: Enlarged heart. No pericardial effusion.

Small hiatal hernia. Mild atherosclerosis. No mediastinal or

hilar adenopathy.



Thyroid Gland: Normal.



Lungs: Passive atelectasis in the medial right lower lobe. Stable

4 mm pulmonary nodules in the right lower lobe. No new pulmonary

nodules.



Airways: Normal.



Pleura: Normal.



Musculoskeletal and Soft Tissues: No acute fracture or aggressive

appearing osseous lesion. Median sternotomy wires in place and

intact. Soft tissues unremarkable.



Subphrenic Structures: Multiple tiny layering gallstones.



IMPRESSION:

1. No acute abnormality of the chest.

2. Stable 4 mm pulmonary nodules in the right lower lobe. Given

greater than twelve-month stability, no further follow-up

recommended as below.



Multiple Solid lung nodules < 6 mm: Follow up management based on

most suspicious nodule. In a low-risk patient, no routine

follow-up imaging is recommended.  In a high-risk patient, a

non-contrast Chest CT at 12 months is optional. If performed and

the nodule is stable at 12 months, no further follow-up is

recommended.    



These guidelines do not apply to patients younger than 35 years,

immunocompromised patients, and patients with cancer. F/u in

patients with significant comorbidities as clinically warranted.

For lung cancer screening, adhere to Lung-RADS guidelines. 

Reference:  Radiology. 2017 Jul; 284(1):228-24



3. Cholelithiasis.



Electronically signed by:  Joseph Hicks MD  10/1/2019 4:10 PM CDT

Workstation: [id number]